# Patient Record
(demographics unavailable — no encounter records)

---

## 2024-10-15 NOTE — PHYSICAL EXAM
[de-identified] : POSTOP RIGHT KNEE EXAM Edema: mild well healing surgical incision without surrounding erythema/drainage  ROM 0-110 degrees of flexion Stable to varus/valgus stress Stable to Anterior/posterior drawer test  Neurovascular exam Motor function 5/5 distal lower extremity Sensation to light touch: intact Distal pulses: 2+  POSTOP LEFT KNEE EXAM Edema: mild well healing surgical incision without surrounding erythema/drainage  ROM 0-110 degrees of flexion Stable to varus/valgus stress Stable to Anterior/posterior drawer test  Neurovascular exam Motor function 5/5 distal lower extremity Sensation to light touch: intact Distal pulses: 2+  10/15/2024  XR of the B knee today demonstrate TKA in place w/o signs of interval changes from postoperative XR  XR lumbar L4/5 and L5/S1 DDD

## 2024-10-15 NOTE — HISTORY OF PRESENT ILLNESS
[] : Post Surgical Visit: yes [de-identified] : 01/17/2023 pt is 59 years old male who present evaluation of Ananda knees on   01/05/2023 Mr. CHANTAL COOK,  saw Dr Llanos , for b/l knee pain present since at least 2019. Known dx of djd. treated with csi in the past, most recent about 1 year ago. Was planned for knee replacements, but was delayed due to needing a pacemaker. Describes his pain as R>L. Reports that he experiences instability and giving way of both knees.  Pmhx: pacemaker, stents, gastric bypas x2, HBP  pt states no improvement since the last time he saw Dr llanos.   03/14/2023 follow up/ CT report   04/25/2023  post op visit. 1st postop s/p R TKA. Doing well. Taking tylenol #3 and flexeril for pain.  05/23/2023 post op visit    06/13/2023 post visit right TKA. pt states he fell down on the right knee 6/9/23. Since than the knee has been more swollen and  hurting him.    09/12/2023 CHANTAL is here today for left knee post op #1. c/o constant pain and swelling. ambulating with walker. home PT.  Reporting significant pain and swelling to LLE. Taking oxy 10 mg for pain.   10/15/24 pt is here for follow up on rt knee, says he has pain both knees pt states he fell 3 times within the month and he fell on both knees. [de-identified] : 08/28/23 [de-identified] : L ALPHONSO TKA

## 2024-10-15 NOTE — ASSESSMENT
[FreeTextEntry1] : 59 year M with bilateral knee OA with R >L cant take NSAIDs due to gastric bypass, discussed weight loss with patient. He has already undergone gastric bypas.  4/25/23: 1st post op s/p R TKA. Transition to OP PT. Follow up in one month. Continue ASA BID X 2 weeks.   05/23/2023 renewed PT, 6 weeks for routeine follow  06/13/2023 doing well from right knee however has a contusion from falling. Left knee has severe knee OA  IRad M.D. discussed the patient's diagnosis and treatment options, non-surgical and surgical, with the patient and/or legal guardian including the potential benefits and complications of each. Potential complications of non-surgical treatments discussed include residual pain and/or disability, non-healing, progression of symptoms and/or disease. Potential complications of surgery discussed include infection, nerve injury, vascular injury, cartilage injury, ligament injury, tendon injury, muscle injury, skin injury, stiffness, instability, weakness, persistent pain, technical or hardware failure, need for additional surgery, re-injury, medical complication, anesthetic related complication, and/or death. All questions were answered. The patient and/or legal guardian has elected to proceed with surgery. Informed consent obtained for Left ALPHONSO TKA                     Preoperative evaluation and testing as needed is advised within 30 days prior to the procedure.   07/11/2023 preop visit for left TKA. surgery discussed in detail.  9/12/23: progressing well. Duplex LLE to r/o blood clot. PT provided  1 month fu  10/15/2024 6 weeks s/p Left TKA and c/o weakness with stairs, + low back pain PT provided for bilateral TKA 6 week fu

## 2025-01-02 NOTE — REVIEW OF SYSTEMS
[Obesity] : obesity [Unusual Sleep Behavior] : unusual sleep behavior [EDS: ESS=____] : no daytime somnolence [Fatigue] : no fatigue [Hypersomnolence] : not sleeping much more than usual

## 2025-01-02 NOTE — PHYSICAL EXAM
[General Appearance - Well Developed] : well developed [Normal Appearance] : normal appearance [General Appearance - Well Nourished] : well nourished [IV] : IV [Neck Appearance] : the appearance of the neck was normal [Heart Rate And Rhythm] : heart rate was normal and rhythm regular [Heart Sounds] : normal S1 and S2 [FreeTextEntry1] : Uses cane [Nail Clubbing] : no clubbing of the fingernails [Cyanosis, Localized] : no localized cyanosis [Oriented To Time, Place, And Person] : oriented to person, place, and time [Impaired Insight] : insight and judgment were intact [Affect] : the affect was normal

## 2025-01-02 NOTE — HISTORY OF PRESENT ILLNESS
[FreeTextEntry1] : 61-year-old male for follow up of chronic insomnia.  Comorbid medical conditions include coronary artery disease s/p PCI, symptomatic bradycardia s/p PPM placement (2017), hypertension, hyperlipidemia, hypothyroidism, depression, gout for which he takes Wellbutrin twice daily, and morbid obesity, acute cholecystitis s/p IR percutaneous cholecystostomy (10/9/2020) and s/p cholecystectomy ~ 2/2021. He has a history of gastric bypass surgery in 2013 and has lost 240 lbs since the surgery. He is s/p repeat gastric bypass. Overall gained about 20lbs since last sleep study.  PSA elevated s/p biopsy, reported negatively for malignancy.   He remains on zolpidem CR 12.5mg and doing well with that.  He takes it around 10 PM and falls asleep within 30 minutes and sleeps through the night unless he does not take the medication.  No side effects reported.   Diagnostic studies:   PSG (4/13/2018) AHI of 2.1 overall, REM related AHI was 9/hr with increasing in ETCO2 from 40 at baseline to 50 mm Hg during REM. PSG (2/16/2021) which showed an AHI of 2.6/hr, REM AHI of 16.4/hr, T90 of 0%.

## 2025-01-02 NOTE — ASSESSMENT
[FreeTextEntry1] : 61-year-old male here for follow up of chronic insomnia. He has been doing well on zolpidem CR 12.5 mg which he can continue taking. Rx for refills sent to the pharmacy. Will repeat a sleep study given weight gain to reassess severity. Follow up in 6 months, sooner if needed.

## 2025-05-08 NOTE — HISTORY OF PRESENT ILLNESS
[FreeTextEntry1] : Emir Sims is a 60y/o man with Hx of HTN, HLD, CAD s/p PCI, SHARRI, and sinus node dysfunction s/p dual chamber PPM placement who presents today for routine f/u and device check. Admits doing well. Denies chest pain, palpitations, SOB, syncope or near syncope.

## 2025-05-08 NOTE — CARDIOLOGY SUMMARY
[de-identified] :  9/14/2018, CORONARY VESSELS: The coronary circulation is right dominant.  LM: -- LM: Normal.  LAD: -- Proximal LAD: There was a 70 % stenosis. -- Mid LAD: There was a 70 % stenosis. FFR 0.79 Patent mid LAD stent after mid LAD stenosis  CX: -- Circumflex: The vessel was large sized.  RCA: -- RCA: This vessel was not injected, but was visualized during a prior cardiac catheterization on 09/13/2018.  COMPLICATIONS: No complications occurred during the cath lab visit. DIAGNOSTIC IMPRESSIONS: 70% proximal and mid LAD stenosis. FFR 0.79  INTERVENTIONAL IMPRESSIONS: s/p FROYLAN 3.0 x 28 mm Synergy stent

## 2025-05-08 NOTE — DISCUSSION/SUMMARY
[FreeTextEntry1] : Impression:  1. Sinus node dysfunction: s/p dual chamber PPM placement. EKG performed today to assess for presence of appropriate pacing and reveals NSR. Device checked today and reveals normal PPM functioning. Brief AT noted. Resume routine device checks as scheduled.   2. HTN: resume oral antihypertensives as prescribed. Encouraged heart healthy diet, sodium restriction, and weight loss. Continue regular f/u with Cardiologist for further HTN management.  3. HLD: resume statin therapy as prescribed and regular f/u with Cardiologist for routine lipid monitoring and management.  4. SHARRI: resume compliance with SHARRI management to prevent future sinus node dysfunction and atrial fibrillation. Encouraged weight loss.  Will continue f/u with Cardiologist and may RTO as needed or if any new or worsening symptoms or findings occur. [EKG obtained to assist in diagnosis and management of assessed problem(s)] : EKG obtained to assist in diagnosis and management of assessed problem(s)